# Patient Record
Sex: FEMALE | Race: WHITE
[De-identification: names, ages, dates, MRNs, and addresses within clinical notes are randomized per-mention and may not be internally consistent; named-entity substitution may affect disease eponyms.]

---

## 2017-08-28 VITALS
SYSTOLIC BLOOD PRESSURE: 120 MMHG | DIASTOLIC BLOOD PRESSURE: 72 MMHG | BODY MASS INDEX: 31.91 KG/M2 | WEIGHT: 169 LBS | HEIGHT: 61 IN

## 2018-05-11 ENCOUNTER — APPOINTMENT (OUTPATIENT)
Dept: PEDIATRICS | Facility: CLINIC | Age: 23
End: 2018-05-11
Payer: COMMERCIAL

## 2018-05-11 PROCEDURE — 86580 TB INTRADERMAL TEST: CPT

## 2018-05-11 RX ORDER — NORETHINDRONE ACETATE AND ETHINYL ESTRADIOL, AND FERROUS FUMARATE 1MG-20(24)
1-20 KIT ORAL
Qty: 28 | Refills: 0 | Status: ACTIVE | COMMUNITY
Start: 2017-12-28

## 2018-05-11 NOTE — DISCUSSION/SUMMARY
[FreeTextEntry1] : PPD placed on left forearm\par she will return on Monday, 5/14/18 for reading the TB test.

## 2018-05-14 ENCOUNTER — APPOINTMENT (OUTPATIENT)
Dept: PEDIATRICS | Facility: CLINIC | Age: 23
End: 2018-05-14
Payer: COMMERCIAL

## 2018-05-14 DIAGNOSIS — Z11.1 ENCOUNTER FOR SCREENING FOR RESPIRATORY TUBERCULOSIS: ICD-10-CM

## 2018-05-14 PROCEDURE — ZZZZZ: CPT

## 2019-05-09 ENCOUNTER — APPOINTMENT (OUTPATIENT)
Dept: PEDIATRICS | Facility: CLINIC | Age: 24
End: 2019-05-09
Payer: COMMERCIAL

## 2019-05-09 VITALS
DIASTOLIC BLOOD PRESSURE: 80 MMHG | BODY MASS INDEX: 30.19 KG/M2 | HEIGHT: 61.25 IN | SYSTOLIC BLOOD PRESSURE: 120 MMHG | HEART RATE: 76 BPM | WEIGHT: 162 LBS

## 2019-05-09 VITALS — DIASTOLIC BLOOD PRESSURE: 60 MMHG | SYSTOLIC BLOOD PRESSURE: 110 MMHG

## 2019-05-09 DIAGNOSIS — Z00.00 ENCOUNTER FOR GENERAL ADULT MEDICAL EXAMINATION W/OUT ABNORMAL FINDINGS: ICD-10-CM

## 2019-05-09 DIAGNOSIS — Z11.1 ENCOUNTER FOR SCREENING FOR RESPIRATORY TUBERCULOSIS: ICD-10-CM

## 2019-05-09 PROCEDURE — 81003 URINALYSIS AUTO W/O SCOPE: CPT | Mod: QW

## 2019-05-09 PROCEDURE — 90632 HEPA VACCINE ADULT IM: CPT

## 2019-05-09 PROCEDURE — 90715 TDAP VACCINE 7 YRS/> IM: CPT

## 2019-05-09 PROCEDURE — 90472 IMMUNIZATION ADMIN EACH ADD: CPT

## 2019-05-09 PROCEDURE — 99395 PREV VISIT EST AGE 18-39: CPT | Mod: 25

## 2019-05-09 PROCEDURE — 90686 IIV4 VACC NO PRSV 0.5 ML IM: CPT

## 2019-05-09 PROCEDURE — 90471 IMMUNIZATION ADMIN: CPT

## 2019-05-09 NOTE — PHYSICAL EXAM
[Alert] : alert [No Acute Distress] : no acute distress [Normocephalic] : normocephalic [EOMI Bilateral] : EOMI bilateral [Clear tympanic membranes with bony landmarks and light reflex present bilaterally] : clear tympanic membranes with bony landmarks and light reflex present bilaterally  [Pink Nasal Mucosa] : pink nasal mucosa [Supple, full passive range of motion] : supple, full passive range of motion [Nonerythematous Oropharynx] : nonerythematous oropharynx [No Palpable Masses] : no palpable masses [Clear to Ausculatation Bilaterally] : clear to auscultation bilaterally [Normal S1, S2 audible] : normal S1, S2 audible [Regular Rate and Rhythm] : regular rate and rhythm [No Murmurs] : no murmurs [+2 Femoral Pulses] : +2 femoral pulses [NonTender] : non tender [Soft] : soft [Non Distended] : non distended [No Hepatomegaly] : no hepatomegaly [Normoactive Bowel Sounds] : normoactive bowel sounds [No Splenomegaly] : no splenomegaly [Radhames: ____] : Radhames [unfilled] [No Masses] : no masses [Radhames: _____] : Radhamse [unfilled] [Normal External Genitalia] : normal external genitalia [Normal Muscle Tone] : normal muscle tone [No Abnormal Lymph Nodes Palpated] : no abnormal lymph nodes palpated [No Gait Asymmetry] : no gait asymmetry [No pain or deformities with palpation of bone, muscles, joints] : no pain or deformities with palpation of bone, muscles, joints [Straight] : straight [+2 Patella DTR] : +2 patella DTR [Cranial Nerves Grossly Intact] : cranial nerves grossly intact [No Rash or Lesions] : no rash or lesions

## 2019-05-09 NOTE — DISCUSSION/SUMMARY
[Met privately with the adolescent for part of the office visit?] : Met privately with the adolescent for part of the office visit? Yes [] : Counseling for  all components of the vaccines given today (see orders below) discussed with patient and patient’s parent/legal guardian. VIS statement provided as well. All questions answered. [Adolescent demonstrates understanding of his/her conditions and how to take prescribed medications?] : Adolescent demonstrates understanding of his/her conditions and how to take prescribed medications? Yes [Adolescent is competent in independently making appointments, filling prescriptions, following up on referrals, and seeking emergency services, as needed?] : Adolescent is competent in independently making appointments, filling prescriptions, following up on referrals, and seeking emergency services, as needed? Yes [Adolescent's caregivers were provided with the opportunity to discuss their concerns about transferring decision making responsibility to the adolescent?] : Adolescent's caregivers were provided with the opportunity to discuss their concerns about transferring decision making responsibility to the adolescent? Yes [Adolescent asks questions during each office  visit and participates in the care plan?] : Adolescent asks questions during each office visit and participates in the care plan? Yes [Discussed using [facility] to access health records and communicate with the adolescent's care team?] : Discussed using [facility] to access health records and communicate with the adolescent's care team? Yes [Discussed choices for adult care and assist in identifying possible care providers?] : Discussed choices for adult care and assist in identifying possible care providers? Yes [Initiated discussion about transfer to an adult healthcare provider.  Provided copy of transition letter?] : Initiated discussion about transfer to an adult healthcare provider.  Provided copy of transition letter? Yes [Discussed nuances of care with the adult provider?] : Discussed nuances of care with the adult provider? Yes [Initiated communication with the adult provider that the family and adolescent has selected?] : Initiated communication with the adult provider that the family and adolescent has selected? Yes [FreeTextEntry1] : Olivia is a healthy 23 year old young adult who as an adult, shows maturity, intellectual growth , her physical exam is unremarkable. She received the following Vaccinations:  Hep A, Tdap, Flu Vx\par I ordered the following labs : MMRV AB titers, Varicella AB titers, QuantiFERON Gold +, Hep A Surface AB, Antigen, and Hep B Core AB. I also ordered a CBC. Vision  and UA are wnl. \par 120/80 repeat 110/60, last year 120/70\par ? borderline HTN, needs to lose weight , repeat BP [Followed up after the transfer?] : Followed up after the transfer? No

## 2019-05-09 NOTE — HISTORY OF PRESENT ILLNESS
[Yes] : Patient goes to dentist yearly [Up to date] : Up to date [Normal] : normal [Eats meals with family] : eats meals with family [Has family members/adults to turn to for help] : has family members/adults to turn to for help [Is permitted and is able to make independent decisions] : Is permitted and is able to make independent decisions [Grade: ____] : Grade: [unfilled] [Eats regular meals including adequate fruits and vegetables] : eats regular meals including adequate fruits and vegetables [Drinks non-sweetened liquids] : drinks non-sweetened liquids  [Calcium source] : calcium source [Has friends] : has friends [At least 1 hour of physical activity a day] : at least 1 hour of physical activity a day [Screen time (except homework) less than 2 hours a day] : screen time (except homework) less than 2 hours a day [Has interests/participates in community activities/volunteers] : has interests/participates in community activities/volunteers. [Uses safety belts/safety equipment] : uses safety belts/safety equipment  [Has peer relationships free of violence] : has peer relationships free of violence [No] : Patient has not had sexual intercourse. [Displays self-confidence] : displays self-confidence [With Teen] : teen [Has concerns about body or appearance] : does not have concerns about body or appearance [Uses electronic nicotine delivery system] : does not use electronic nicotine delivery system [Exposure to electronic nicotine delivery system] : no exposure to electronic nicotine delivery system [Uses tobacco] : does not use tobacco [Exposure to tobacco] : no exposure to tobacco [Uses drugs] : does not use drugs  [Exposure to drugs] : no exposure to drugs [Drinks alcohol] : does not drink alcohol [Exposure to alcohol] : no exposure to alcohol [Impaired/distracted driving] : no impaired/distracted driving [Has thought about hurting self or considered suicide] : has not thought about hurting self or considered suicide [Has problems with sleep] : does not have problems with sleep [FreeTextEntry7] : 23 year old check -- labs and vaccines for health care requirement for Nursing school [de-identified] : patient [de-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 She has no specific concerns.

## 2019-05-10 LAB
BASOPHILS # BLD AUTO: 0.04 K/UL
BASOPHILS NFR BLD AUTO: 0.5 %
EOSINOPHIL # BLD AUTO: 0.08 K/UL
EOSINOPHIL NFR BLD AUTO: 1 %
HBV CORE IGG+IGM SER QL: NONREACTIVE
HBV SURFACE AB SERPL IA-ACNC: 61.3 MIU/ML
HBV SURFACE AG SER QL: NONREACTIVE
HCT VFR BLD CALC: 42.4 %
HGB BLD-MCNC: 13.4 G/DL
IMM GRANULOCYTES NFR BLD AUTO: 0.4 %
LYMPHOCYTES # BLD AUTO: 1.71 K/UL
LYMPHOCYTES NFR BLD AUTO: 20.9 %
MAN DIFF?: NORMAL
MCHC RBC-ENTMCNC: 30 PG
MCHC RBC-ENTMCNC: 31.6 GM/DL
MCV RBC AUTO: 95.1 FL
MEV IGG FLD QL IA: 234 AU/ML
MEV IGG+IGM SER-IMP: POSITIVE
MONOCYTES # BLD AUTO: 0.59 K/UL
MONOCYTES NFR BLD AUTO: 7.2 %
MUV AB SER-ACNC: POSITIVE
MUV IGG SER QL IA: 68.7 AU/ML
NEUTROPHILS # BLD AUTO: 5.75 K/UL
NEUTROPHILS NFR BLD AUTO: 70 %
PLATELET # BLD AUTO: 290 K/UL
RBC # BLD: 4.46 M/UL
RBC # FLD: 12 %
RUBV IGG FLD-ACNC: 5 INDEX
RUBV IGG SER-IMP: POSITIVE
VZV AB TITR SER: NEGATIVE
VZV IGG SER IF-ACNC: 134.7 INDEX
WBC # FLD AUTO: 8.2 K/UL

## 2019-05-16 LAB
M TB IFN-G BLD-IMP: ABNORMAL
QUANTIFERON TB PLUS MITOGEN MINUS NIL: 0.42 IU/ML
QUANTIFERON TB PLUS NIL: 0.01 IU/ML
QUANTIFERON TB PLUS TB1 MINUS NIL: 0 IU/ML
QUANTIFERON TB PLUS TB2 MINUS NIL: 0 IU/ML

## 2019-05-21 DIAGNOSIS — R03.0 ELEVATED BLOOD-PRESSURE READING, W/OUT DIAGNOSIS OF HYPERTENSION: ICD-10-CM

## 2019-06-27 ENCOUNTER — APPOINTMENT (OUTPATIENT)
Dept: PEDIATRICS | Facility: CLINIC | Age: 24
End: 2019-06-27
Payer: COMMERCIAL

## 2019-06-28 ENCOUNTER — APPOINTMENT (OUTPATIENT)
Dept: PEDIATRICS | Facility: CLINIC | Age: 24
End: 2019-06-28
Payer: COMMERCIAL

## 2019-06-28 VITALS — SYSTOLIC BLOOD PRESSURE: 120 MMHG | DIASTOLIC BLOOD PRESSURE: 80 MMHG

## 2019-06-28 DIAGNOSIS — Z01.89 ENCOUNTER FOR OTHER SPECIFIED SPECIAL EXAMINATIONS: ICD-10-CM

## 2019-06-28 DIAGNOSIS — Z11.1 ENCOUNTER FOR SCREENING FOR RESPIRATORY TUBERCULOSIS: ICD-10-CM

## 2019-06-28 DIAGNOSIS — Z23 ENCOUNTER FOR IMMUNIZATION: ICD-10-CM

## 2019-06-28 PROCEDURE — 86580 TB INTRADERMAL TEST: CPT

## 2019-06-28 PROCEDURE — 90471 IMMUNIZATION ADMIN: CPT

## 2019-06-28 PROCEDURE — 90746 HEPB VACCINE 3 DOSE ADULT IM: CPT

## 2019-06-28 PROCEDURE — 90472 IMMUNIZATION ADMIN EACH ADD: CPT

## 2019-06-28 PROCEDURE — 90716 VAR VACCINE LIVE SUBQ: CPT

## 2020-12-21 PROBLEM — Z11.1 ENCOUNTER FOR PPD TEST: Status: RESOLVED | Noted: 2019-06-27 | Resolved: 2020-12-21

## 2022-11-27 ENCOUNTER — NON-APPOINTMENT (OUTPATIENT)
Age: 27
End: 2022-11-27